# Patient Record
Sex: FEMALE | Race: OTHER | HISPANIC OR LATINO
[De-identification: names, ages, dates, MRNs, and addresses within clinical notes are randomized per-mention and may not be internally consistent; named-entity substitution may affect disease eponyms.]

---

## 2017-11-20 ENCOUNTER — APPOINTMENT (OUTPATIENT)
Dept: OBGYN | Facility: CLINIC | Age: 42
End: 2017-11-20

## 2018-08-29 ENCOUNTER — EMERGENCY (EMERGENCY)
Facility: HOSPITAL | Age: 43
LOS: 1 days | Discharge: HOME | End: 2018-08-29
Attending: EMERGENCY MEDICINE
Payer: SELF-PAY

## 2018-08-29 VITALS
DIASTOLIC BLOOD PRESSURE: 75 MMHG | HEART RATE: 72 BPM | OXYGEN SATURATION: 98 % | HEIGHT: 67 IN | TEMPERATURE: 98 F | RESPIRATION RATE: 18 BRPM | WEIGHT: 179.9 LBS | SYSTOLIC BLOOD PRESSURE: 109 MMHG

## 2018-08-29 VITALS
HEART RATE: 58 BPM | OXYGEN SATURATION: 100 % | TEMPERATURE: 98 F | DIASTOLIC BLOOD PRESSURE: 92 MMHG | RESPIRATION RATE: 20 BRPM | SYSTOLIC BLOOD PRESSURE: 132 MMHG

## 2018-08-29 DIAGNOSIS — Z90.711 ACQUIRED ABSENCE OF UTERUS WITH REMAINING CERVICAL STUMP: Chronic | ICD-10-CM

## 2018-08-29 DIAGNOSIS — Z98.89 OTHER SPECIFIED POSTPROCEDURAL STATES: Chronic | ICD-10-CM

## 2018-08-29 LAB
ALBUMIN SERPL ELPH-MCNC: 4.3 G/DL — SIGNIFICANT CHANGE UP (ref 3.3–5)
ALP SERPL-CCNC: 93 U/L — SIGNIFICANT CHANGE UP (ref 40–120)
ALT FLD-CCNC: 15 U/L — SIGNIFICANT CHANGE UP (ref 10–45)
ANION GAP SERPL CALC-SCNC: 14 MMOL/L — SIGNIFICANT CHANGE UP (ref 5–17)
APPEARANCE UR: ABNORMAL
AST SERPL-CCNC: 18 U/L — SIGNIFICANT CHANGE UP (ref 10–40)
BACTERIA # UR AUTO: ABNORMAL /HPF
BASOPHILS # BLD AUTO: 0.1 K/UL — SIGNIFICANT CHANGE UP (ref 0–0.2)
BASOPHILS NFR BLD AUTO: 0.6 % — SIGNIFICANT CHANGE UP (ref 0–2)
BILIRUB SERPL-MCNC: 0.3 MG/DL — SIGNIFICANT CHANGE UP (ref 0.2–1.2)
BILIRUB UR-MCNC: NEGATIVE — SIGNIFICANT CHANGE UP
BUN SERPL-MCNC: 10 MG/DL — SIGNIFICANT CHANGE UP (ref 7–23)
CALCIUM SERPL-MCNC: 9.6 MG/DL — SIGNIFICANT CHANGE UP (ref 8.4–10.5)
CHLORIDE SERPL-SCNC: 103 MMOL/L — SIGNIFICANT CHANGE UP (ref 96–108)
CO2 SERPL-SCNC: 25 MMOL/L — SIGNIFICANT CHANGE UP (ref 22–31)
COLOR SPEC: YELLOW — SIGNIFICANT CHANGE UP
CREAT SERPL-MCNC: 0.75 MG/DL — SIGNIFICANT CHANGE UP (ref 0.5–1.3)
DIFF PNL FLD: ABNORMAL
EOSINOPHIL # BLD AUTO: 0.2 K/UL — SIGNIFICANT CHANGE UP (ref 0–0.5)
EOSINOPHIL NFR BLD AUTO: 1.6 % — SIGNIFICANT CHANGE UP (ref 0–6)
EPI CELLS # UR: SIGNIFICANT CHANGE UP /HPF
GAS PNL BLDV: SIGNIFICANT CHANGE UP
GLUCOSE SERPL-MCNC: 91 MG/DL — SIGNIFICANT CHANGE UP (ref 70–99)
GLUCOSE UR QL: NEGATIVE — SIGNIFICANT CHANGE UP
HCT VFR BLD CALC: 41.8 % — SIGNIFICANT CHANGE UP (ref 34.5–45)
HGB BLD-MCNC: 13.4 G/DL — SIGNIFICANT CHANGE UP (ref 11.5–15.5)
KETONES UR-MCNC: NEGATIVE — SIGNIFICANT CHANGE UP
LEUKOCYTE ESTERASE UR-ACNC: ABNORMAL
LIDOCAIN IGE QN: 36 U/L — SIGNIFICANT CHANGE UP (ref 7–60)
LYMPHOCYTES # BLD AUTO: 2 K/UL — SIGNIFICANT CHANGE UP (ref 1–3.3)
LYMPHOCYTES # BLD AUTO: 20.3 % — SIGNIFICANT CHANGE UP (ref 13–44)
MCHC RBC-ENTMCNC: 29 PG — SIGNIFICANT CHANGE UP (ref 27–34)
MCHC RBC-ENTMCNC: 32 GM/DL — SIGNIFICANT CHANGE UP (ref 32–36)
MCV RBC AUTO: 90.8 FL — SIGNIFICANT CHANGE UP (ref 80–100)
MONOCYTES # BLD AUTO: 0.8 K/UL — SIGNIFICANT CHANGE UP (ref 0–0.9)
MONOCYTES NFR BLD AUTO: 7.7 % — SIGNIFICANT CHANGE UP (ref 2–14)
NEUTROPHILS # BLD AUTO: 6.8 K/UL — SIGNIFICANT CHANGE UP (ref 1.8–7.4)
NEUTROPHILS NFR BLD AUTO: 69.8 % — SIGNIFICANT CHANGE UP (ref 43–77)
NITRITE UR-MCNC: POSITIVE
PH UR: 7.5 — SIGNIFICANT CHANGE UP (ref 5–8)
PLATELET # BLD AUTO: 269 K/UL — SIGNIFICANT CHANGE UP (ref 150–400)
POTASSIUM SERPL-MCNC: 4 MMOL/L — SIGNIFICANT CHANGE UP (ref 3.5–5.3)
POTASSIUM SERPL-SCNC: 4 MMOL/L — SIGNIFICANT CHANGE UP (ref 3.5–5.3)
PROT SERPL-MCNC: 8.2 G/DL — SIGNIFICANT CHANGE UP (ref 6–8.3)
PROT UR-MCNC: 30 MG/DL
RBC # BLD: 4.6 M/UL — SIGNIFICANT CHANGE UP (ref 3.8–5.2)
RBC # FLD: 12.9 % — SIGNIFICANT CHANGE UP (ref 10.3–14.5)
RBC CASTS # UR COMP ASSIST: SIGNIFICANT CHANGE UP /HPF (ref 0–2)
SODIUM SERPL-SCNC: 142 MMOL/L — SIGNIFICANT CHANGE UP (ref 135–145)
SP GR SPEC: 1.01 — SIGNIFICANT CHANGE UP (ref 1.01–1.02)
UROBILINOGEN FLD QL: NEGATIVE — SIGNIFICANT CHANGE UP
WBC # BLD: 9.8 K/UL — SIGNIFICANT CHANGE UP (ref 3.8–10.5)
WBC # FLD AUTO: 9.8 K/UL — SIGNIFICANT CHANGE UP (ref 3.8–10.5)
WBC UR QL: >50 /HPF (ref 0–5)

## 2018-08-29 PROCEDURE — 87086 URINE CULTURE/COLONY COUNT: CPT

## 2018-08-29 PROCEDURE — 84132 ASSAY OF SERUM POTASSIUM: CPT

## 2018-08-29 PROCEDURE — 84295 ASSAY OF SERUM SODIUM: CPT

## 2018-08-29 PROCEDURE — 85014 HEMATOCRIT: CPT

## 2018-08-29 PROCEDURE — 74176 CT ABD & PELVIS W/O CONTRAST: CPT

## 2018-08-29 PROCEDURE — 99284 EMERGENCY DEPT VISIT MOD MDM: CPT

## 2018-08-29 PROCEDURE — 82435 ASSAY OF BLOOD CHLORIDE: CPT

## 2018-08-29 PROCEDURE — 82330 ASSAY OF CALCIUM: CPT

## 2018-08-29 PROCEDURE — 99284 EMERGENCY DEPT VISIT MOD MDM: CPT | Mod: 25

## 2018-08-29 PROCEDURE — 81001 URINALYSIS AUTO W/SCOPE: CPT

## 2018-08-29 PROCEDURE — 96374 THER/PROPH/DIAG INJ IV PUSH: CPT

## 2018-08-29 PROCEDURE — 87186 SC STD MICRODIL/AGAR DIL: CPT

## 2018-08-29 PROCEDURE — 85027 COMPLETE CBC AUTOMATED: CPT

## 2018-08-29 PROCEDURE — 74176 CT ABD & PELVIS W/O CONTRAST: CPT | Mod: 26

## 2018-08-29 PROCEDURE — 83605 ASSAY OF LACTIC ACID: CPT

## 2018-08-29 PROCEDURE — 82803 BLOOD GASES ANY COMBINATION: CPT

## 2018-08-29 PROCEDURE — 96375 TX/PRO/DX INJ NEW DRUG ADDON: CPT

## 2018-08-29 PROCEDURE — 82947 ASSAY GLUCOSE BLOOD QUANT: CPT

## 2018-08-29 PROCEDURE — 83690 ASSAY OF LIPASE: CPT

## 2018-08-29 PROCEDURE — 80053 COMPREHEN METABOLIC PANEL: CPT

## 2018-08-29 RX ORDER — KETOROLAC TROMETHAMINE 30 MG/ML
15 SYRINGE (ML) INJECTION ONCE
Qty: 0 | Refills: 0 | Status: DISCONTINUED | OUTPATIENT
Start: 2018-08-29 | End: 2018-08-29

## 2018-08-29 RX ORDER — OXYCODONE HYDROCHLORIDE 5 MG/1
1 TABLET ORAL
Qty: 4 | Refills: 0 | OUTPATIENT
Start: 2018-08-29 | End: 2018-08-29

## 2018-08-29 RX ORDER — CEFPODOXIME PROXETIL 100 MG
1 TABLET ORAL
Qty: 28 | Refills: 0 | OUTPATIENT
Start: 2018-08-29 | End: 2018-09-11

## 2018-08-29 RX ORDER — ONDANSETRON 8 MG/1
4 TABLET, FILM COATED ORAL ONCE
Qty: 0 | Refills: 0 | Status: COMPLETED | OUTPATIENT
Start: 2018-08-29 | End: 2018-08-29

## 2018-08-29 RX ORDER — CEFTRIAXONE 500 MG/1
1 INJECTION, POWDER, FOR SOLUTION INTRAMUSCULAR; INTRAVENOUS ONCE
Qty: 0 | Refills: 0 | Status: COMPLETED | OUTPATIENT
Start: 2018-08-29 | End: 2018-08-29

## 2018-08-29 RX ORDER — ONDANSETRON 8 MG/1
1 TABLET, FILM COATED ORAL
Qty: 6 | Refills: 0 | OUTPATIENT
Start: 2018-08-29 | End: 2018-08-30

## 2018-08-29 RX ORDER — SODIUM CHLORIDE 9 MG/ML
1000 INJECTION INTRAMUSCULAR; INTRAVENOUS; SUBCUTANEOUS ONCE
Qty: 0 | Refills: 0 | Status: COMPLETED | OUTPATIENT
Start: 2018-08-29 | End: 2018-08-29

## 2018-08-29 RX ORDER — ACETAMINOPHEN 500 MG
1000 TABLET ORAL ONCE
Qty: 0 | Refills: 0 | Status: COMPLETED | OUTPATIENT
Start: 2018-08-29 | End: 2018-08-29

## 2018-08-29 RX ADMIN — ONDANSETRON 4 MILLIGRAM(S): 8 TABLET, FILM COATED ORAL at 18:01

## 2018-08-29 RX ADMIN — Medication 400 MILLIGRAM(S): at 18:02

## 2018-08-29 RX ADMIN — Medication 15 MILLIGRAM(S): at 19:25

## 2018-08-29 RX ADMIN — SODIUM CHLORIDE 1000 MILLILITER(S): 9 INJECTION INTRAMUSCULAR; INTRAVENOUS; SUBCUTANEOUS at 18:00

## 2018-08-29 RX ADMIN — CEFTRIAXONE 100 GRAM(S): 500 INJECTION, POWDER, FOR SOLUTION INTRAMUSCULAR; INTRAVENOUS at 18:45

## 2018-08-29 NOTE — ED ADULT NURSE NOTE - OBJECTIVE STATEMENT
44 y/o F, reported to ED from home. A&Ox3, c/o flank pain. Pt reports that she has been having pain for 3 days. Pt reports that the pain got progressively worse today. Pt reports that she has been having right sided back pain that wraps around her body to her mid abdomen. Pt reports that her pain is currently 10/10 on pain scale. Pt denies taking medication for her pain prior to arrival. Pt reports that she has been having some urinary frequency but denies dysuria, urgency or hematuria. Pt reports that she had a partial hysterectomy in 2011 and hasn't had her period since. Pt reports some nausea but denies vomiting. Pt denies diarrhea or fever/chills. Pt reports abd tenderness in the RUQ and RLQ. Pt reports that yesterday she started a "liver cleanse with pills from [facility]." Pt is unsure if these pills have made her symptoms worse. Pt denies LOC, H/A. Family at bedside, will continue to monitor pt.

## 2018-08-29 NOTE — ED PROVIDER NOTE - PLAN OF CARE
1. Rest. Stay well hydrated.   2. Take antibiotic as prescribed: cefpodoxime 100mg twice daily for 14 days.   Take Motrin 600mg every 8hrs for pain as needed. Take with food.  May alternate with Tylenol 650mg every 6 hours as needed.   Take zofran 4mg oral tab every 8 hours as needed for nausea.   Take oxycodone 5mg 1 tab every 8 hours as needed for severe pain. **** CAUTION DROWSINESS DO NOT DRIVE OR DRINK ALCOHOL WHILE TAKING THIS MEDICATION  3. Follow up with your primary care provider in 1-2 days.   4. Return to ER for fever, abdominal pain, back pain, vomiting, inability to take antibiotics or tolerate food/fluid, difficulty urinating, weakness, passing out, or any other concerning symptoms.

## 2018-08-29 NOTE — ED PROVIDER NOTE - OBJECTIVE STATEMENT
44yo F with PSH , hysterectomy and b/l salpingectomy presenting with R flank pain radiating to R side of abd x 3 days. Pt reports pain began suddenly at home, sharp in nature, was initially intermittent but has become gradually more severe and more frequent. Took Tylenol with minimal relief, last time yesterday.  Reports associated nausea. Admits to dysuria 1.5 weeks ago that resolved after drinking more water. Last BM yesterday and normal. Denies any fever/chills, CP, SOB, vomiting, diarrhea, melena, BRBPR, hematuria, h/o kidney stones.

## 2018-08-29 NOTE — ED PROVIDER NOTE - CARE PLAN
Principal Discharge DX:	Pyelonephritis  Assessment and plan of treatment:	1. Rest. Stay well hydrated.   2. Take antibiotic as prescribed: cefpodoxime 100mg twice daily for 14 days.   Take Motrin 600mg every 8hrs for pain as needed. Take with food.  May alternate with Tylenol 650mg every 6 hours as needed.   Take zofran 4mg oral tab every 8 hours as needed for nausea.   Take oxycodone 5mg 1 tab every 8 hours as needed for severe pain. **** CAUTION DROWSINESS DO NOT DRIVE OR DRINK ALCOHOL WHILE TAKING THIS MEDICATION  3. Follow up with your primary care provider in 1-2 days.   4. Return to ER for fever, abdominal pain, back pain, vomiting, inability to take antibiotics or tolerate food/fluid, difficulty urinating, weakness, passing out, or any other concerning symptoms.

## 2018-08-29 NOTE — ED PROVIDER NOTE - ATTENDING CONTRIBUTION TO CARE
42 y/o female with the above documented history and HPI who on exam appears well and comfortable. VSs noted, sclerae anicteric, MM's moist, neck supple, lungs CTA, cardiac sounds s/ audible m/r/g, abdomen soft, ND c/ r-sided discomfort on palpation s/ focality, rebound or guarding, back c/ R CVA ttp, extremities s/ asymmetry, skin s/ rash or jaundice and neurologically intact. Full investigation initiated. UA appreciated. ABX provided. Awaiting CT interpretation. Will follow her CT and treat/dispo accordingly.

## 2018-08-30 RX ORDER — OXYCODONE HYDROCHLORIDE 5 MG/1
1 TABLET ORAL
Qty: 4 | Refills: 0 | OUTPATIENT
Start: 2018-08-30 | End: 2018-09-01

## 2018-09-01 NOTE — ED POST DISCHARGE NOTE - RESULT SUMMARY
Ucx + >100k E.Coli. Patient d/c on cefpodoxime and cx sensitive. Appropriate care received. - Meg Acosta PA-C

## 2019-10-09 NOTE — ED POST DISCHARGE NOTE - REASON FOR FOLLOW-UP
Culture Follow-up
[FreeTextEntry1] : pt to return for left gsv evle - also, referred to dermatologist Dr Bragg to assess left foot darkly pigmented raised mole

## 2020-03-26 ENCOUNTER — EMERGENCY (EMERGENCY)
Facility: HOSPITAL | Age: 45
LOS: 1 days | End: 2020-03-26
Attending: EMERGENCY MEDICINE
Payer: SELF-PAY

## 2020-03-26 VITALS
RESPIRATION RATE: 22 BRPM | HEIGHT: 67 IN | WEIGHT: 175.05 LBS | TEMPERATURE: 100 F | DIASTOLIC BLOOD PRESSURE: 76 MMHG | HEART RATE: 102 BPM | OXYGEN SATURATION: 91 % | SYSTOLIC BLOOD PRESSURE: 106 MMHG

## 2020-03-26 VITALS — TEMPERATURE: 99 F | RESPIRATION RATE: 22 BRPM

## 2020-03-26 DIAGNOSIS — Z90.711 ACQUIRED ABSENCE OF UTERUS WITH REMAINING CERVICAL STUMP: Chronic | ICD-10-CM

## 2020-03-26 DIAGNOSIS — Z98.89 OTHER SPECIFIED POSTPROCEDURAL STATES: Chronic | ICD-10-CM

## 2020-03-26 LAB
ALBUMIN SERPL ELPH-MCNC: 3.9 G/DL — SIGNIFICANT CHANGE UP (ref 3.3–5)
ALP SERPL-CCNC: 79 U/L — SIGNIFICANT CHANGE UP (ref 40–120)
ALT FLD-CCNC: 71 U/L — HIGH (ref 10–45)
ANION GAP SERPL CALC-SCNC: 15 MMOL/L — SIGNIFICANT CHANGE UP (ref 5–17)
AST SERPL-CCNC: 77 U/L — HIGH (ref 10–40)
BASOPHILS # BLD AUTO: 0.02 K/UL — SIGNIFICANT CHANGE UP (ref 0–0.2)
BASOPHILS NFR BLD AUTO: 0.2 % — SIGNIFICANT CHANGE UP (ref 0–2)
BILIRUB SERPL-MCNC: 0.3 MG/DL — SIGNIFICANT CHANGE UP (ref 0.2–1.2)
BUN SERPL-MCNC: 7 MG/DL — SIGNIFICANT CHANGE UP (ref 7–23)
CALCIUM SERPL-MCNC: 9.4 MG/DL — SIGNIFICANT CHANGE UP (ref 8.4–10.5)
CHLORIDE SERPL-SCNC: 98 MMOL/L — SIGNIFICANT CHANGE UP (ref 96–108)
CO2 SERPL-SCNC: 23 MMOL/L — SIGNIFICANT CHANGE UP (ref 22–31)
CREAT SERPL-MCNC: 0.51 MG/DL — SIGNIFICANT CHANGE UP (ref 0.5–1.3)
EOSINOPHIL # BLD AUTO: 0.01 K/UL — SIGNIFICANT CHANGE UP (ref 0–0.5)
EOSINOPHIL NFR BLD AUTO: 0.1 % — SIGNIFICANT CHANGE UP (ref 0–6)
GLUCOSE SERPL-MCNC: 114 MG/DL — HIGH (ref 70–99)
HCT VFR BLD CALC: 41.9 % — SIGNIFICANT CHANGE UP (ref 34.5–45)
HGB BLD-MCNC: 13.6 G/DL — SIGNIFICANT CHANGE UP (ref 11.5–15.5)
IMM GRANULOCYTES NFR BLD AUTO: 0.3 % — SIGNIFICANT CHANGE UP (ref 0–1.5)
LYMPHOCYTES # BLD AUTO: 1.14 K/UL — SIGNIFICANT CHANGE UP (ref 1–3.3)
LYMPHOCYTES # BLD AUTO: 11.5 % — LOW (ref 13–44)
MCHC RBC-ENTMCNC: 28.5 PG — SIGNIFICANT CHANGE UP (ref 27–34)
MCHC RBC-ENTMCNC: 32.5 GM/DL — SIGNIFICANT CHANGE UP (ref 32–36)
MCV RBC AUTO: 87.8 FL — SIGNIFICANT CHANGE UP (ref 80–100)
MONOCYTES # BLD AUTO: 0.59 K/UL — SIGNIFICANT CHANGE UP (ref 0–0.9)
MONOCYTES NFR BLD AUTO: 5.9 % — SIGNIFICANT CHANGE UP (ref 2–14)
NEUTROPHILS # BLD AUTO: 8.14 K/UL — HIGH (ref 1.8–7.4)
NEUTROPHILS NFR BLD AUTO: 82 % — HIGH (ref 43–77)
NRBC # BLD: 0 /100 WBCS — SIGNIFICANT CHANGE UP (ref 0–0)
PLATELET # BLD AUTO: 189 K/UL — SIGNIFICANT CHANGE UP (ref 150–400)
POTASSIUM SERPL-MCNC: 4.2 MMOL/L — SIGNIFICANT CHANGE UP (ref 3.5–5.3)
POTASSIUM SERPL-SCNC: 4.2 MMOL/L — SIGNIFICANT CHANGE UP (ref 3.5–5.3)
PROT SERPL-MCNC: 8.4 G/DL — HIGH (ref 6–8.3)
RBC # BLD: 4.77 M/UL — SIGNIFICANT CHANGE UP (ref 3.8–5.2)
RBC # FLD: 13.5 % — SIGNIFICANT CHANGE UP (ref 10.3–14.5)
SODIUM SERPL-SCNC: 136 MMOL/L — SIGNIFICANT CHANGE UP (ref 135–145)
WBC # BLD: 9.93 K/UL — SIGNIFICANT CHANGE UP (ref 3.8–10.5)
WBC # FLD AUTO: 9.93 K/UL — SIGNIFICANT CHANGE UP (ref 3.8–10.5)

## 2020-03-26 PROCEDURE — 71045 X-RAY EXAM CHEST 1 VIEW: CPT

## 2020-03-26 PROCEDURE — 99284 EMERGENCY DEPT VISIT MOD MDM: CPT

## 2020-03-26 PROCEDURE — 94640 AIRWAY INHALATION TREATMENT: CPT

## 2020-03-26 PROCEDURE — 80053 COMPREHEN METABOLIC PANEL: CPT

## 2020-03-26 PROCEDURE — 99284 EMERGENCY DEPT VISIT MOD MDM: CPT | Mod: 25

## 2020-03-26 PROCEDURE — 85027 COMPLETE CBC AUTOMATED: CPT

## 2020-03-26 PROCEDURE — 71045 X-RAY EXAM CHEST 1 VIEW: CPT | Mod: 26

## 2020-03-26 RX ORDER — SODIUM CHLORIDE 9 MG/ML
500 INJECTION INTRAMUSCULAR; INTRAVENOUS; SUBCUTANEOUS ONCE
Refills: 0 | Status: COMPLETED | OUTPATIENT
Start: 2020-03-26 | End: 2020-03-26

## 2020-03-26 RX ORDER — ACETAMINOPHEN 500 MG
975 TABLET ORAL ONCE
Refills: 0 | Status: COMPLETED | OUTPATIENT
Start: 2020-03-26 | End: 2020-03-26

## 2020-03-26 RX ORDER — ALBUTEROL 90 UG/1
2 AEROSOL, METERED ORAL ONCE
Refills: 0 | Status: COMPLETED | OUTPATIENT
Start: 2020-03-26 | End: 2020-03-26

## 2020-03-26 RX ADMIN — SODIUM CHLORIDE 500 MILLILITER(S): 9 INJECTION INTRAMUSCULAR; INTRAVENOUS; SUBCUTANEOUS at 10:06

## 2020-03-26 RX ADMIN — Medication 975 MILLIGRAM(S): at 10:33

## 2020-03-26 RX ADMIN — ALBUTEROL 2 PUFF(S): 90 AEROSOL, METERED ORAL at 09:57

## 2020-03-26 NOTE — ED ADULT NURSE REASSESSMENT NOTE - NS ED NURSE REASSESS COMMENT FT1
Patient was d/c by MATTHIAS Mendez , d/c papers given, IV taken out, and while getting dressed patient stated she feels SOB and doesn't feel comfortable going home. Patient was then re-evaluated by MD Moon, and patient was deemed safe to be d/c home.

## 2020-03-26 NOTE — ED PROVIDER NOTE - PATIENT PORTAL LINK FT
You can access the FollowMyHealth Patient Portal offered by [facility] by registering at the following website: [email or web address]. By joining [facility]’s FollowMyHealth portal, you will also be able to view your health information using other applications (apps) compatible with our system.

## 2020-03-26 NOTE — ED PROVIDER NOTE - PROGRESS NOTE DETAILS
pt endorses improvement of symptoms s/p albuterol inhaler, pt resting comfortably with improvement in coughing, O2 saturations 95% at rest.-Alejandra Mendez PA-C Patient re-evaluated. Labs and imaging reviewed. No lymphocytopenia or thrombocytopenia; CXR with viral pattern. Patient reports feeling better, wants to go home. States that she understands she may have COVID and understands to quarantine herself. Strict return precautions given.  All labs and imaging results (if any), were reviewed with the patient. Patient understands to follow up with their regular doctor. Patient understands to return to the ED is symptoms worsen or progress. Discharge instructions were given to the patient and discussed with patient.

## 2020-03-26 NOTE — ED PROVIDER NOTE - NSFOLLOWUPINSTRUCTIONS_ED_ALL_ED_FT
1) Call your primary care provider to discuss this visit.    2) Continue to take all medications as prescribed. Read all discharge instructions.     3) Rest and drink plenty of fluids. Pain and fever can be managed with Acetaminophen (aka Tylenol) and Ibuprofen (aka Motrin or Advil) over the counter as directed.    4) Return to the ER for any new or worsening symptoms especially shortness of breath, worsening, cough, fever, chills, chest pain, or if any other concerns..     We recommend the below precautionary steps from now until 14 days from when you returned from your travel or date of your last known possible contact:  - Do not go to work, school, or public areas. Avoid using public transportation, ride-sharing, or taxis.  -Wear a mask whenever you are around other people.  -Avoid sharing personal household items. You should not share dishes, drinking glasses, cups, eating utensils, towels, or bedding with other people or pets in your home. After using these items, they should be washed thoroughly with soap and water.   - Avoid touching your eyes, nose, and mouth with your hands.  - Wash your hands often with soap and water for at least 15 to 20 seconds or clean your hands with an alcohol-based hand  that contains 60 to 95% alcohol, covering all surfaces of your hands and rubbing them together until they feel dry. Soap and water should be used preferentially if hands are visibly dirty.

## 2020-03-26 NOTE — ED PROVIDER NOTE - NS ED ROS FT
Constitutional: see hpi  Eyes: No visual changes, eye pain or redness  HEENT: No throat pain, ear pain, nasal pain. No nose bleeding.  CV: No chest pain or lower extremity edema  Resp: see hpi  GI: No abd pain. No nausea or vomiting. No diarrhea. No constipation.   : No dysuria, hematuria.   MSK: No musculoskeletal pain  Skin: No rash  Neuro: No headache. No numbness or tingling. No weakness.

## 2020-03-26 NOTE — ED PROVIDER NOTE - ATTENDING CONTRIBUTION TO CARE
44F no PMHx with fever, cough, concern for possible COVID patients. They have not travelled to any high risk areas and have not been in close contact with known positive COVID patient. States that she has been having profuse coughing, tried taking Tessalon Perles with no relief which she obtained from an urgent care 2 days ago. Patient states 2 days ago was tested for COVID and RVP, started on Tamiflu by the urgent care. Came to the ED because the coughing has not stopped.    PHYSICAL EXAMINATION:  VITALS REVIEWED.  VS febrile, HR 82 on repeat, SPO2 96% on RA  GENERALIZED APPEARANCE: Intermittent coughing fits, otherwise comfortable  SKIN:  Warm, dry, no cyanosis  HEAD:  No obvious scalp lesions  EYES:  Conjunctiva pink, no icterus  ENMT:  Mucus membranes moist, no stridor  NECK:  Supple, non-tender  CHEST AND RESPIRATORY:  Clear to auscultation B/L, good air entry B/L, equal chest expansion  HEART AND CARDIOVASCULAR:  Regular rate, no obvious murmur  ABDOMEN AND GI:  Soft, non-tender, non-distended.  No rebound, no guarding  EXTREMITIES:  No deformity, edema, or calf tenderness  NEURO: AAOx3, gross motor and sensory intact    Impression:  Likely COVID19, r/o superimposed PNA, r/o electrolyte abnormality  Labs, imaging, monitoring, re-eval, symptomatic treatment

## 2020-03-26 NOTE — ED PROVIDER NOTE - OBJECTIVE STATEMENT
43 yo female with no pmh presents out of a concern for a possible COVID infection. They have not recently travelled to any of the current high risk areas and have not been in close contact with a known positive COVID19-infected patient. Pt has had cough, fevers and sob x 9 days, has primarily stayed at home other than to be tested for COVID 2 days ago without results back yet.  Pt has been taking tessalon perles with no relief, denies difficulty breathing, chest pains, states symptoms have not improved.

## 2020-03-26 NOTE — ED PROVIDER NOTE - PHYSICAL EXAMINATION
A&Ox3, NAD. NCAT. PERRL, EOMI. Neck supple, no LAD. Lungs CTAB, breathing comfortably, no retractions or jvd. +S1S2, RRR, No m/r/g. Abd soft, NT/ND, +BS, no rebound or guarding. Extremities: cap refill <2, pulses in distal extremities 4+, no edema. Skin without rash. CN II-XII intact. Strength 5/5 UE/LE. Sensations intact throughout. Gait steady.

## 2020-03-26 NOTE — ED ADULT NURSE NOTE - OBJECTIVE STATEMENT
Patient is a 44 yr old female who came to the ED from home who complains of worsening cough/SOB/fever. Patient has been experiencing symptoms for over 1 week, has not been able to hold food down and has experienced d/n/v/chills, with white and blood tinged mucous during frequent coughs. Patient had went to urgent care previously, receiving tamiflu and tessalon perles with no relief. Patient had been tested for covid-19 two days ago but results have not come back yet. Patient states she has come into contact with known positive covid patients recently. Patient lungs are clear, has some labored breathing at times and has intermittent coughs with sats in low 90s, otherwise saturating at 96% on RA at rest. Patient oral temp currently at 101F. Patient is AOx4, skin intact, +2pulses, denies body aches/pain/chest pain, is ambulatory, and currently NSR on cardiac monitor. Patient was given IV fluid bolus and albuterol with some relief. Heplock placed, labs drawn and sent. Patient safety maintained, call bell at bedside, will continue to monitor.